# Patient Record
Sex: FEMALE | Race: WHITE
[De-identification: names, ages, dates, MRNs, and addresses within clinical notes are randomized per-mention and may not be internally consistent; named-entity substitution may affect disease eponyms.]

---

## 2019-03-04 ENCOUNTER — HOSPITAL ENCOUNTER (EMERGENCY)
Dept: HOSPITAL 61 - ER | Age: 39
Discharge: HOME | End: 2019-03-04
Payer: COMMERCIAL

## 2019-03-04 VITALS — BODY MASS INDEX: 50.02 KG/M2 | WEIGHT: 293 LBS | HEIGHT: 64 IN

## 2019-03-04 VITALS — SYSTOLIC BLOOD PRESSURE: 112 MMHG | DIASTOLIC BLOOD PRESSURE: 58 MMHG

## 2019-03-04 DIAGNOSIS — Z91.09: ICD-10-CM

## 2019-03-04 DIAGNOSIS — J18.9: Primary | ICD-10-CM

## 2019-03-04 DIAGNOSIS — F41.0: ICD-10-CM

## 2019-03-04 DIAGNOSIS — Z88.8: ICD-10-CM

## 2019-03-04 DIAGNOSIS — R07.89: ICD-10-CM

## 2019-03-04 DIAGNOSIS — E66.01: ICD-10-CM

## 2019-03-04 LAB
% ATYL: 1 % (ref 0–0)
% BANDS: 7 % (ref 0–9)
% LYMPHS: 8 % (ref 24–48)
% MONOS: 4 % (ref 0–10)
% SEGS: 79 % (ref 35–66)
ALBUMIN SERPL-MCNC: 2.8 G/DL (ref 3.4–5)
ALBUMIN/GLOB SERPL: 0.8 {RATIO} (ref 1–1.7)
ALP SERPL-CCNC: 66 U/L (ref 46–116)
ALT SERPL-CCNC: 43 U/L (ref 14–59)
AMPHETAMINE/METHAMPHETAMINE: (no result)
ANION GAP SERPL CALC-SCNC: 11 MMOL/L (ref 6–14)
AST SERPL-CCNC: 27 U/L (ref 15–37)
BARBITURATES UR-MCNC: (no result) UG/ML
BASOPHILS # BLD AUTO: 0 X10^3/UL (ref 0–0.2)
BASOPHILS NFR BLD AUTO: 1 % (ref 0–3)
BASOPHILS NFR BLD: 0 % (ref 0–3)
BENZODIAZ UR-MCNC: (no result) UG/L
BILIRUB SERPL-MCNC: 0.3 MG/DL (ref 0.2–1)
BUN SERPL-MCNC: 7 MG/DL (ref 7–20)
BUN/CREAT SERPL: 10 (ref 6–20)
CALCIUM SERPL-MCNC: 8.5 MG/DL (ref 8.5–10.1)
CANNABINOIDS UR-MCNC: (no result) UG/L
CHLORIDE SERPL-SCNC: 106 MMOL/L (ref 98–107)
CK SERPL-CCNC: 82 U/L (ref 26–192)
CO2 SERPL-SCNC: 25 MMOL/L (ref 21–32)
COCAINE UR-MCNC: (no result) NG/ML
CREAT SERPL-MCNC: 0.7 MG/DL (ref 0.6–1)
EOSINOPHIL NFR BLD: 0 % (ref 0–3)
EOSINOPHIL NFR BLD: 0 X10^3/UL (ref 0–0.7)
ERYTHROCYTE [DISTWIDTH] IN BLOOD BY AUTOMATED COUNT: 14.6 % (ref 11.5–14.5)
GFR SERPLBLD BASED ON 1.73 SQ M-ARVRAT: 94.7 ML/MIN
GLOBULIN SER-MCNC: 3.7 G/DL (ref 2.2–3.8)
GLUCOSE SERPL-MCNC: 128 MG/DL (ref 70–99)
HCT VFR BLD CALC: 36.6 % (ref 36–47)
HGB BLD-MCNC: 11.7 G/DL (ref 12–15.5)
LIPASE: 111 U/L (ref 73–393)
LYMPHOCYTES # BLD: 1 X10^3/UL (ref 1–4.8)
LYMPHOCYTES NFR BLD AUTO: 8 % (ref 24–48)
MAGNESIUM SERPL-MCNC: 1.9 MG/DL (ref 1.8–2.4)
MCH RBC QN AUTO: 28 PG (ref 25–35)
MCHC RBC AUTO-ENTMCNC: 32 G/DL (ref 31–37)
MCV RBC AUTO: 87 FL (ref 79–100)
METHADONE SERPL-MCNC: (no result) NG/ML
MONO #: 0.6 X10^3/UL (ref 0–1.1)
MONOCYTES NFR BLD: 5 % (ref 0–9)
NEUT #: 11.8 X10^3UL (ref 1.8–7.7)
NEUTROPHILS NFR BLD AUTO: 87 % (ref 31–73)
OPIATES UR-MCNC: (no result) NG/ML
PCP SERPL-MCNC: (no result) MG/DL
PLATELET # BLD AUTO: 234 X10^3/UL (ref 140–400)
PLATELET # BLD EST: ADEQUATE 10*3/UL
POTASSIUM SERPL-SCNC: 4.1 MMOL/L (ref 3.5–5.1)
PROT SERPL-MCNC: 6.5 G/DL (ref 6.4–8.2)
RBC # BLD AUTO: 4.21 X10^6/UL (ref 3.5–5.4)
SODIUM SERPL-SCNC: 142 MMOL/L (ref 136–145)
WBC # BLD AUTO: 13.5 X10^3/UL (ref 4–11)

## 2019-03-04 PROCEDURE — 83735 ASSAY OF MAGNESIUM: CPT

## 2019-03-04 PROCEDURE — 93005 ELECTROCARDIOGRAM TRACING: CPT

## 2019-03-04 PROCEDURE — 84484 ASSAY OF TROPONIN QUANT: CPT

## 2019-03-04 PROCEDURE — 81025 URINE PREGNANCY TEST: CPT

## 2019-03-04 PROCEDURE — 80307 DRUG TEST PRSMV CHEM ANLYZR: CPT

## 2019-03-04 PROCEDURE — 85379 FIBRIN DEGRADATION QUANT: CPT

## 2019-03-04 PROCEDURE — 83880 ASSAY OF NATRIURETIC PEPTIDE: CPT

## 2019-03-04 PROCEDURE — 83690 ASSAY OF LIPASE: CPT

## 2019-03-04 PROCEDURE — 71275 CT ANGIOGRAPHY CHEST: CPT

## 2019-03-04 PROCEDURE — 96374 THER/PROPH/DIAG INJ IV PUSH: CPT

## 2019-03-04 PROCEDURE — 96375 TX/PRO/DX INJ NEW DRUG ADDON: CPT

## 2019-03-04 PROCEDURE — 85025 COMPLETE CBC W/AUTO DIFF WBC: CPT

## 2019-03-04 PROCEDURE — 71045 X-RAY EXAM CHEST 1 VIEW: CPT

## 2019-03-04 PROCEDURE — 85007 BL SMEAR W/DIFF WBC COUNT: CPT

## 2019-03-04 PROCEDURE — 99284 EMERGENCY DEPT VISIT MOD MDM: CPT

## 2019-03-04 PROCEDURE — 36415 COLL VENOUS BLD VENIPUNCTURE: CPT

## 2019-03-04 PROCEDURE — 80053 COMPREHEN METABOLIC PANEL: CPT

## 2019-03-04 PROCEDURE — 82550 ASSAY OF CK (CPK): CPT

## 2019-03-04 NOTE — RAD
EXAM: CT chest with contrast - pulmonary embolus protocol

 

CLINICAL HISTORY: CHEST PAIN  SOA   ABNORMAL CXR  OMNI 350 100ML.

 

COMPARISON: None.

 

TECHNIQUE: CT of the chest following the administration of intravenous 

contrast during the pulmonary arterial phase. Axial, coronal and sagittal 

reformatted images were generated including MIP images.

 

---PQRS compliance statement - One or more of the following individualized

dose reduction techniques were utilized for this study:

1.  Automated exposure control

2.  Adjustment of the mA and/or kV according to patient size

3.  Use of iterative reconstruction technique---

 

FINDINGS:

CHEST: 

Diagnostic quality: Suboptimal. 

Pulmonary emboli:  No pulmonary emboli to the level of the lobar branches.

More peripheral vessels are not well assessed.

Right heart strain: None

Pulmonary arteries: Normal in caliber.

 

The heart is mildly enlarged. Pericardial effusion. 

 

Prominent mediastinal and hilar lymph nodes are seen, not enlarged by size

criteria. Calcified left hilar lymph node is seen. No axillary 

lymphadenopathy.

 

Diffuse left left-sided nodular and groundglass airspace opacities are 

seen predominantly in the perihilar distribution these are suspicious for 

infectious/inflammatory process. The right lung is grossly clear. No 

pleural effusion or pneumothorax.

 

Visualized Upper abdomen:  Relative hypoattenuation of the liver may be 

seen with hepatic steatosis. Changes of prior gastric surgery are seen.

 

Bones: Osseous structures are unremarkable.

 

IMPRESSION:

1.  Limited evaluation for pulmonary embolus given contrast bolus 

limitations. No pulmonary emboli to the level of the lobar branches. More 

peripheral vessels are not well assessed.

2.  Left-sided nodular and groundglass airspace opacities diffusely likely

infectious/inflammatory process such as pneumonia.

 

Electronically signed by: Chaitanya Ortega MD (3/4/2019 12:11 PM) [facility]

## 2019-03-04 NOTE — EKG
[facility]

              [address] [place] [address]

Test Date:    2019               Test Time:    09:05:02

Pat Name:     HAMILTON LYON        Department:   

Patient ID:   [id number]           Room:          

Gender:       F                        Technician:   

:          1980               Requested By: BRYAN BARKLEY

Order Number: [id number]           Reading MD:   Misael Ramirez MD

                                 Measurements

Intervals                              Axis          

Rate:         93                       P:            24

NV:           154                      QRS:          -7

QRSD:         78                       T:            24

QT:           334                                    

QTc:          418                                    

                           Interpretive Statements

SINUS RHYTHM



Electronically Signed On 3-5-2019 7:02:17 CST by Misael Ramirez MD

## 2019-03-04 NOTE — PHYS DOC
Past Medical History


Past Medical History:  Anxiety, Bipolar, Fibromyalgia, Other


Additional Past Medical Histor:  PANIC ATTACKS


Past Surgical History:  Other


Additional Past Surgical Histo:  GASTRIC SLEEVE,CLEFT PALATE,RHINOPLASTY


Additional Information:  


Denies smoking


Alcohol Use:  Occasionally


Drug Use:  Marijuana





Adult General


Chief Complaint


Chief Complaint:  CHEST PAIN





HPI


HPI





Patient is a 38  year old female who brought in by EMS because of chest pain. 

Patient states she was driving remiss ago and developed substernal pressure 

chest pain with radiation to right side of jaw and shoulder as a constant pain 

and rated her pain 9/10. Patient complaining of shortness of breath and 

palpitation without nausea and dizziness. Patient states the pain getting worse 

with movement. She was treated with 324 mg of aspirin and nitroglycerin 1 by 

EMS with improvement of her pain to 4/10. Patient states she had history of 

another episode of chest pain with diagnosis of panic attacks. Patient denies 

history of hypertension, diabetes, dyslipidemia, smoking, PE and DVT. Patient 

had family history of coronary artery disease.





Review of Systems


Review of Systems





Constitutional: Denies fever or chills []


Eyes: Denies change in visual acuity, redness, or eye pain []


HENT: Denies nasal congestion or sore throat []


Respiratory: Reports cough and shortness of breath


Cardiovascular: No additional information not addressed in HPI []


GI: Denies abdominal pain, nausea, vomiting, bloody stools or diarrhea []


: Denies dysuria or hematuria []


Musculoskeletal: Denies back pain or joint pain []


Integument: Denies rash or skin lesions []


Neurologic: Denies headache, focal weakness or sensory changes []


Endocrine: Denies polyuria or polydipsia []





All other systems were reviewed and found to be within normal limits, except as 

documented in this note.





Current Medications


Current Medications





Current Medications








 Medications


  (Trade)  Dose


 Ordered  Sig/Rhona  Start Time


 Stop Time Status Last Admin


Dose Admin


 


 Ceftriaxone Sodium


  (Rocephin)  1 gm  1X  ONCE  3/4/19 12:30


 3/4/19 12:31 DC 3/4/19 12:33


1 GM


 


 Info


  (CONTRAST GIVEN


 -- Rx MONITORING)  1 each  PRN DAILY  PRN  3/4/19 10:30


 3/4/19 13:46 DC  





 


 Iohexol


  (Omnipaque 350


 Mg/ml)  100 ml  1X  ONCE  3/4/19 10:30


 3/4/19 10:31 DC 3/4/19 11:18


100 ML


 


 Ketorolac


 Tromethamine


  (Toradol 30mg


 Vial)  30 mg  1X  ONCE  3/4/19 09:15


 3/4/19 09:18 DC 3/4/19 09:47


30 MG











Allergies


Allergies





Allergies








Coded Allergies Type Severity Reaction Last Updated Verified


 


  lithium Allergy Intermediate  3/4/19 Yes


 


  zonisamide Allergy Intermediate  3/4/19 Yes


 


Uncoded Allergies Type Severity Reaction Last Updated Verified


 


  ZONAGRAN Allergy Unknown  3/4/19 











Physical Exam


Physical Exam





Constitutional: Well developed, well nourished, mild distress, non-toxic 

appearance, morbidly obese. []


HENT: Normocephalic, atraumatic.


Eyes: PERRLA, EOMI, conjunctiva normal, no discharge. [] 


Neck: Normal range of motion, no tenderness, supple, no stridor. [] 


Cardiovascular:Heart rate regular rhythm, no murmur []


Lungs & Thorax:  Bilateral breath sounds clear to auscultation , no 

reproducible chest pain[]


Abdomen: Bowel sounds normal, soft, no tenderness, no masses, no pulsatile 

masses. [] 


Skin: Warm, dry, no erythema, no rash. [] 


Back: No tenderness, no CVA tenderness. [] 


Extremities: No tenderness, no cyanosis, no clubbing, ROM intact, no edema. [] 


Neurologic: Alert and oriented X 3, normal motor function, normal sensory 

function, no focal deficits noted. []


Psychologic: Affect anxious, judgement normal, mood normal. []





Current Patient Data


Vital Signs





 Vital Signs








  Date Time  Temp Pulse Resp B/P (MAP) Pulse Ox O2 Delivery O2 Flow Rate FiO2


 


3/4/19 13:15  84 17 112/58 (76) 96 Room Air  


 


3/4/19 08:59 98.5       





 98.5       








Lab Values





 Laboratory Tests








Test


 3/4/19


09:40 3/4/19


10:54 3/4/19


10:56


 


White Blood Count


 13.5 x10^3/uL


(4.0-11.0)  H 


 





 


Red Blood Count


 4.21 x10^6/uL


(3.50-5.40) 


 





 


Hemoglobin


 11.7 g/dL


(12.0-15.5)  L 


 





 


Hematocrit


 36.6 %


(36.0-47.0) 


 





 


Mean Corpuscular Volume


 87 fL ()


 


 





 


Mean Corpuscular Hemoglobin 28 pg (25-35)    


 


Mean Corpuscular Hemoglobin


Concent 32 g/dL


(31-37) 


 





 


Red Cell Distribution Width


 14.6 %


(11.5-14.5)  H 


 





 


Platelet Count


 234 x10^3/uL


(140-400) 


 





 


Neutrophils (%) (Auto) 87 % (31-73)  H  


 


Lymphocytes (%) (Auto) 8 % (24-48)  L  


 


Monocytes (%) (Auto) 5 % (0-9)    


 


Eosinophils (%) (Auto) 0 % (0-3)    


 


Basophils (%) (Auto) 0 % (0-3)    


 


Neutrophils # (Auto)


 11.8 x10^3uL


(1.8-7.7)  H 


 





 


Lymphocytes # (Auto)


 1.0 x10^3/uL


(1.0-4.8) 


 





 


Monocytes # (Auto)


 0.6 x10^3/uL


(0.0-1.1) 


 





 


Eosinophils # (Auto)


 0.0 x10^3/uL


(0.0-0.7) 


 





 


Basophils # (Auto)


 0.0 x10^3/uL


(0.0-0.2) 


 





 


Segmented Neutrophils % 79 % (35-66)  H  


 


Band Neutrophils % 7 % (0-9)    


 


Lymphocytes % 8 % (24-48)  L  


 


Atypical Lymphocytes %


(Manual) 1 % (0-0)  H


 


 





 


Monocytes % 4 % (0-10)    


 


Basophils % 1 % (0-3)    


 


Platelet Estimate


 Adequate


(ADEQUATE) 


 





 


D-Dimer (Ambreen)


 0.39 ug/mlFEU


(0.00-0.50) 


 





 


Sodium Level


 142 mmol/L


(136-145) 


 





 


Potassium Level


 4.1 mmol/L


(3.5-5.1) 


 





 


Chloride Level


 106 mmol/L


() 


 





 


Carbon Dioxide Level


 25 mmol/L


(21-32) 


 





 


Anion Gap 11 (6-14)    


 


Blood Urea Nitrogen


 7 mg/dL (7-20)


 


 





 


Creatinine


 0.7 mg/dL


(0.6-1.0) 


 





 


Estimated GFR


(Cockcroft-Gault) 94.7  


 


 





 


BUN/Creatinine Ratio 10 (6-20)    


 


Glucose Level


 128 mg/dL


(70-99)  H 


 





 


Calcium Level


 8.5 mg/dL


(8.5-10.1) 


 





 


Magnesium Level


 1.9 mg/dL


(1.8-2.4) 


 





 


Total Bilirubin


 0.3 mg/dL


(0.2-1.0) 


 





 


Aspartate Amino Transferase


(AST) 27 U/L (15-37)


 


 





 


Alanine Aminotransferase (ALT)


 43 U/L (14-59)


 


 





 


Alkaline Phosphatase


 66 U/L


() 


 





 


Creatine Kinase


 82 U/L


() 


 





 


Troponin I Quantitative


 < 0.017 ng/mL


(0.000-0.055) 


 





 


NT-Pro-B-Type Natriuretic


Peptide 133 pg/mL


(0-124)  H 


 





 


Total Protein


 6.5 g/dL


(6.4-8.2) 


 





 


Albumin


 2.8 g/dL


(3.4-5.0)  L 


 





 


Albumin/Globulin Ratio


 0.8 (1.0-1.7)


L 


 





 


Lipase


 111 U/L


() 


 





 


Urine Opiates Screen  Pos (NEG)   


 


Urine Methadone Screen  Neg (NEG)   


 


Urine Barbiturates  Neg (NEG)   


 


Urine Phencyclidine Screen  Neg (NEG)   


 


Urine


Amphetamine/Methamphetamine 


 Neg (NEG)  


 





 


Urine Benzodiazepines Screen  Neg (NEG)   


 


Urine Cocaine Screen  Neg (NEG)   


 


Urine Cannabinoids Screen  Neg (NEG)   


 


Urine Ethyl Alcohol  Neg (NEG)   


 


POC Urine HCG, Qualitative


 


 


 Hcg negative


(Negative)





 Laboratory Tests


3/4/19 09:40








 Laboratory Tests


3/4/19 09:40











EKG


EKG


EKG interpreted by me. EKG at 0905 showed normal sinus rhythm at rate of 93, 

left donaldson axis, normal MN and QT intervals, poor R-wave progress anteroseptal 

leads, no acute ST and T-wave abnormalities.





Radiology/Procedures


Radiology/Procedures


[facility]


 [address] [facility] [place] [address]


 [phone]


 


 IMAGING REPORT





 Signed





PATIENT: HAMILTON LYON ACCOUNT: [id number] MRN#: [id number]


: 1980 LOCATION: ER AGE: 38


SEX: F EXAM DT: 19 ACCESSION#: [id number]


STATUS: PRE ER ORD. PHYSICIAN: BRYAN BARKLEY MD 


REASON: chest pain


PROCEDURE: PORTABLE CHEST 1V





EXAM: Chest, single view.


 


HISTORY: Chest pain.


 


COMPARISON: None.


 


FINDINGS: A frontal view of the chest obtained. There is diffuse 


interstitial infiltrate. There is right hilar enlargement possibly due to 


enlarged central pulmonary vessels or hilar lymphadenopathy. There is a 


prominent cardiac silhouette, likely accentuated due to portable 


technique. No pleural effusion or pneumothorax is seen.


 


IMPRESSION: 


1. Diffuse interstitial infiltrate.


2. Right hilar enlargement likely due to prominent pulmonary vascular 


shadows or lymphadenopathy. Short-term radiographic or CT follow-up is 


recommended to exclude a hilar mass.


 


Electronically signed by: Kelly Manzo MD (3/4/2019 9:45 AM) [place] [address]














DICTATED and SIGNED BY:     KELLY MANZO MD


DATE:     19 0944





Course & Med Decision Making


Course & Med Decision Making


Pertinent Labs and Imaging studies reviewed. (See chart for details)





Evolution of patient in ER showed 38-year-old male patient with history of 

anxiety brought in by EMS because of chest pain. Patient had unremarkable EKG. 

Patient had leukocytosis with abnormal chest x-ray for possible lung nodule or 

infiltration. CT of chest did not show PE lung nodule. CT of chest showed 

infiltration. Patient treated with Rocephin and Toradol and felt better. 

Patient did not have cardiac risk factor except for obesity. Plan discharge 

patient home with diagnose of pneumonia and noncardiac chest pain and 

instruction to follow up with her primary care physician in 3-5 days.





Dragon Disclaimer


Dragon Disclaimer


This electronic medical record was generated, in whole or in part, using a 

voice recognition dictation system.





Departure


Departure


Impression:  


 Primary Impression:  


 CAP (community acquired pneumonia)


 Additional Impressions:  


 Non-cardiac chest pain


 Morbid obesity with BMI of 50.0-59.9, adult


 Panic attack


Disposition:   HOME, SELF-CARE (at 1300)


Condition:  IMPROVED


Patient Instructions:  Chest Wall Pain, Pneumonia, Adult





Additional Instructions:  


Drink plenty of liquids


Follow-up with your primary care physician in 3-5 days


Return to ER if not getting better


Scripts


Benzonatate (TESSALON PERLE) 100 Mg Capsule


1 CAP PO TID for cough, #21 CAP


   Prov: BRYAN BARKLEY MD         3/4/19 


Albuterol Sulfate (PROAIR HFA INHALER) 8.5 Gm Hfa.aer.ad


2 PUFF INH PRN Q6HRS PRN for SHORTNESS OF BREATH, #1 INHALER 0 Refills


   Prov: BRYAN BARKLEY MD         3/4/19 


Azithromycin (ZITHROMAX) 250 Mg Tablet


1 PKG PO UD for infection, #1 PKG


   Prov: BRYAN BARKLEY MD         3/4/19





Problem Qualifiers








 Primary Impression:  


 CAP (community acquired pneumonia)


 Laterality:  unspecified laterality  Qualified Codes:  J18.9 - Pneumonia, 

unspecified organism








BRYAN BARKLEY MD Mar 4, 2019 10:18

## 2019-03-04 NOTE — RAD
EXAM: Chest, single view.

 

HISTORY: Chest pain.

 

COMPARISON: None.

 

FINDINGS: A frontal view of the chest obtained. There is diffuse 

interstitial infiltrate. There is right hilar enlargement possibly due to 

enlarged central pulmonary vessels or hilar lymphadenopathy. There is a 

prominent cardiac silhouette, likely accentuated due to portable 

technique. No pleural effusion or pneumothorax is seen.

 

IMPRESSION: 

1. Diffuse interstitial infiltrate.

2. Right hilar enlargement likely due to prominent pulmonary vascular 

shadows or lymphadenopathy. Short-term radiographic or CT follow-up is 

recommended to exclude a hilar mass.

 

Electronically signed by: Kelly Lopez MD (3/4/2019 9:45 AM) [facility]